# Patient Record
Sex: FEMALE | Race: WHITE | HISPANIC OR LATINO | URBAN - METROPOLITAN AREA
[De-identification: names, ages, dates, MRNs, and addresses within clinical notes are randomized per-mention and may not be internally consistent; named-entity substitution may affect disease eponyms.]

---

## 2024-01-23 ENCOUNTER — TELEPHONE ENCOUNTER (OUTPATIENT)
Dept: URBAN - METROPOLITAN AREA CLINIC 98 | Facility: CLINIC | Age: 24
End: 2024-01-23

## 2024-01-25 ENCOUNTER — OFFICE VISIT (OUTPATIENT)
Dept: URBAN - METROPOLITAN AREA CLINIC 98 | Facility: CLINIC | Age: 24
End: 2024-01-25
Payer: COMMERCIAL

## 2024-01-25 VITALS
WEIGHT: 228 LBS | HEIGHT: 66 IN | BODY MASS INDEX: 36.64 KG/M2 | TEMPERATURE: 97.4 F | SYSTOLIC BLOOD PRESSURE: 128 MMHG | HEART RATE: 82 BPM | DIASTOLIC BLOOD PRESSURE: 88 MMHG

## 2024-01-25 DIAGNOSIS — K52.89 (LYMPHOCYTIC) MICROSCOPIC COLITIS: ICD-10-CM

## 2024-01-25 PROCEDURE — 99214 OFFICE O/P EST MOD 30 MIN: CPT | Performed by: INTERNAL MEDICINE

## 2024-01-25 NOTE — HPI-TODAY'S VISIT:
Here w mother; she is in school and worksas a vet nurse  2 weeks ago : ate "normal" food- woke up w pain and crampy - sudden onset throw up or diarrhea  stabbing pain in abdomen  threw up 4x  - did have diarrhea afterwards like urine  went to Urgent Care - got IVF and zofran + amox/clav sx resolved after 1 day- light foods  last weekend : had phuong cheesesteak - similar sx afterwards has always had abd pain intermitently, in lower abdomen : attributed to  PCOS

## 2024-01-25 NOTE — HPI-OTHER HISTORIES
[place] [facility]  CLINICAL HISTORY:  Age:  23 years . Gender:  Female. Stated history: " Abdominal pain, acute, nonlocalized" Additionalhistory:  None. COMPARISON:  None. TECHNIQUE:CT ABDOMEN PELVIS W CONTRASTOne or more of the following measures wereutilized for radiation dose reduction in concordance with the ALARAprincipal ( as low as reasonably achievable): Automatic exposurecontrol, adjustment of the kV or mA according to patient's size,iterative reconstruction technique FINDINGS:  The lung bases are clear and the heart size is normal. No acute process in the liver, gallbladder, pancreas spleen or adrenalglands. Kidneys are nonobstructed and the bladder is not inflamed.Appendix is normal. Fundal IUD is noted. Fluid within the nondilatedcolon mild mucosal colonic hyperemia noted suspicious for pancolitis aswell as enteritis fluid-filled loops of nondilated small bowel. Aorta isnot aneurysmal. There is no free air or aggressive osseous lesion. IMPRESSION:1.  Enteritis and pancolitis.2.  Normal appendix.

## 2024-01-25 NOTE — PHYSICAL EXAM HENT:
Head,  normocephalic,  atraumatic,  Face,  Face within normal limits,  Ears,  External ears within normal limits,  
Statement Selected

## 2024-01-27 LAB
IMMUNOGLOBULIN A: 168
INTERPRETATION: (no result)
TISSUE TRANSGLUTAMINASE AB, IGA: <1

## 2024-01-29 ENCOUNTER — TELEPHONE ENCOUNTER (OUTPATIENT)
Dept: URBAN - METROPOLITAN AREA CLINIC 98 | Facility: CLINIC | Age: 24
End: 2024-01-29

## 2024-02-22 ENCOUNTER — TELEP (OUTPATIENT)
Dept: URBAN - METROPOLITAN AREA TELEHEALTH 2 | Facility: TELEHEALTH | Age: 24
End: 2024-02-22
Payer: COMMERCIAL

## 2024-02-22 DIAGNOSIS — K52.89 (LYMPHOCYTIC) MICROSCOPIC COLITIS: ICD-10-CM

## 2024-02-22 PROCEDURE — 99212 OFFICE O/P EST SF 10 MIN: CPT | Performed by: INTERNAL MEDICINE

## 2024-02-22 NOTE — HPI-TODAY'S VISIT:
Here on [place]  diet has returned to normal  did have loose stools after ice cream  otherwise back to normal , 95%

## 2024-02-22 NOTE — PHYSICAL EXAM NEUROLOGIC:
oriented to person, place and time ; short and long term memory intact
s/p right foot 2nd metatarsal head resection of osteomyelitis/yes

## 2024-02-29 ENCOUNTER — OV NP (OUTPATIENT)
Dept: URBAN - METROPOLITAN AREA CLINIC 96 | Facility: CLINIC | Age: 24
End: 2024-02-29

## 2024-04-18 ENCOUNTER — OV EP (OUTPATIENT)
Dept: URBAN - METROPOLITAN AREA CLINIC 98 | Facility: CLINIC | Age: 24
End: 2024-04-18